# Patient Record
Sex: MALE | Race: WHITE | NOT HISPANIC OR LATINO | Employment: FULL TIME | URBAN - METROPOLITAN AREA
[De-identification: names, ages, dates, MRNs, and addresses within clinical notes are randomized per-mention and may not be internally consistent; named-entity substitution may affect disease eponyms.]

---
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
## 2023-08-22 ENCOUNTER — OFFICE VISIT (OUTPATIENT)
Dept: URGENT CARE | Facility: CLINIC | Age: 18
End: 2023-08-22
Payer: OTHER MISCELLANEOUS

## 2023-08-22 ENCOUNTER — HOSPITAL ENCOUNTER (OUTPATIENT)
Dept: RADIOLOGY | Facility: CLINIC | Age: 18
Discharge: HOME | End: 2023-08-22
Attending: NURSE PRACTITIONER

## 2023-08-22 VITALS
HEIGHT: 73 IN | TEMPERATURE: 100 F | BODY MASS INDEX: 32.47 KG/M2 | WEIGHT: 245 LBS | DIASTOLIC BLOOD PRESSURE: 68 MMHG | RESPIRATION RATE: 20 BRPM | HEART RATE: 100 BPM | OXYGEN SATURATION: 98 % | SYSTOLIC BLOOD PRESSURE: 130 MMHG

## 2023-08-22 DIAGNOSIS — S99.922A FOOT INJURY, LEFT, INITIAL ENCOUNTER: ICD-10-CM

## 2023-08-22 DIAGNOSIS — S99.922A FOOT INJURY, LEFT, INITIAL ENCOUNTER: Primary | ICD-10-CM

## 2023-08-22 DIAGNOSIS — S92.344A CLOSED NONDISPLACED FRACTURE OF FOURTH METATARSAL BONE OF RIGHT FOOT, INITIAL ENCOUNTER: ICD-10-CM

## 2023-08-22 DIAGNOSIS — S92.335A CLOSED NONDISPLACED FRACTURE OF THIRD METATARSAL BONE OF LEFT FOOT, INITIAL ENCOUNTER: ICD-10-CM

## 2023-08-22 DIAGNOSIS — S92.325A CLOSED NONDISPLACED FRACTURE OF SECOND METATARSAL BONE OF LEFT FOOT, INITIAL ENCOUNTER: ICD-10-CM

## 2023-08-22 DIAGNOSIS — Z02.6 ENCOUNTER RELATED TO WORKER'S COMPENSATION CLAIM: ICD-10-CM

## 2023-08-22 LAB — BREATH ALCOHOL: 0

## 2023-08-22 PROCEDURE — 73630 XR FOOT COMPLETE 3 VIEW LEFT: ICD-10-PCS | Mod: LT,S$GLB,, | Performed by: STUDENT IN AN ORGANIZED HEALTH CARE EDUCATION/TRAINING PROGRAM

## 2023-08-22 PROCEDURE — 80305 DRUG TEST PRSMV DIR OPT OBS: CPT | Mod: S$GLB,,, | Performed by: NURSE PRACTITIONER

## 2023-08-22 PROCEDURE — 82075 ASSAY OF BREATH ETHANOL: CPT | Mod: S$GLB,,, | Performed by: NURSE PRACTITIONER

## 2023-08-22 PROCEDURE — 99203 OFFICE O/P NEW LOW 30 MIN: CPT | Mod: S$GLB,,, | Performed by: NURSE PRACTITIONER

## 2023-08-22 PROCEDURE — 99203 PR OFFICE/OUTPT VISIT, NEW, LEVL III, 30-44 MIN: ICD-10-PCS | Mod: S$GLB,,, | Performed by: NURSE PRACTITIONER

## 2023-08-22 PROCEDURE — 73630 X-RAY EXAM OF FOOT: CPT | Mod: LT,S$GLB,, | Performed by: STUDENT IN AN ORGANIZED HEALTH CARE EDUCATION/TRAINING PROGRAM

## 2023-08-22 PROCEDURE — 80305 OOH NON-DOT DRUG SCREEN: ICD-10-PCS | Mod: S$GLB,,, | Performed by: NURSE PRACTITIONER

## 2023-08-22 PROCEDURE — 82075 POCT BREATH ALCOHOL TEST: ICD-10-PCS | Mod: S$GLB,,, | Performed by: NURSE PRACTITIONER

## 2023-08-22 NOTE — LETTER
[facility]  [address], [address]  [place] [address]  Phone: [phone]  Fax: [phone]  [facility]: [phone]    Pt Name: Luis Angel Early Date: 08/22/2023   Employee ID:  Date of First Treatment: 08/22/2023   Company: Networked reference to record [employer]      Appointment Time: 06:15 PM Arrived: 6:30PM   Provider: Mauro Rock NP Time Out:7:55 PM     Office Treatment:   1. Foot injury, left, initial encounter    2. Closed nondisplaced fracture of second metatarsal bone of left foot, initial encounter    3. Closed nondisplaced fracture of third metatarsal bone of left foot, initial encounter    4. Closed nondisplaced fracture of fourth metatarsal bone of right foot, initial encounter    5. Encounter related to worker's compensation claim          Patient Instructions: Apply ice 24-48 hours then apply heat/warm soaks, Elevated affected area, Attention not to aggravate affected area          Return Appointment: Visit date not found at TBD by [facility]

## 2023-08-23 DIAGNOSIS — M79.672 LEFT FOOT PAIN: Primary | ICD-10-CM

## 2023-08-23 NOTE — PATIENT INSTRUCTIONS
PRICE therapy:  Protect the joint with stabilizing brace like a neoprene sleeve or taping.   Rest the extremity--limit activity with this area  Ice 2-3 times a day for 20 minute intervals for first 48 hours or until inflammation has stabilized   Compression to provide support and help prevent swelling  Elevation above heart level to help decrease swelling  You have been seen for a work-related injury/ailment.  It is important that you follow up at one of the Occupational Health Clinics in the next business day for further treatment/evaluation.    Please call [phone] for help setting up the appointment.      A list of clinics is listed below:    - [address] [facility] [address], [place] [address] ([phone])  - [address] [address] ([phone])  - [address] [place] [address] ([phone])  - [address], [place] [address] ([phone])

## 2023-08-23 NOTE — PROGRESS NOTES
Subjective:      Patient ID: Luis Angel Jang is a 17 y.o. male.    Chief Complaint: Foot Injury    Patient's place of employment - [employer]  Patient's job title -   Date of injury - 08/22/2023  Body part injured including left or right - Left foot  Injury Mechanism -  Backyard machine fell over the top of the patient Left foot  What they were doing when they got hurt -  He was standing and the center of gravity was top heavy which made the backyard machine topple over onto his Left foot  What they did immediately after -  Notified supervisor removed boot and sock and immediately elevated foot  Pain scale right now -  1 unless he is weight bearing then it is a 7    Foot Injury   The incident occurred 3 to 6 hours ago. The incident occurred at work. The injury mechanism was a direct blow. The pain is present in the left foot. The quality of the pain is described as shooting. The pain is at a severity of 2/10. The pain is moderate. The pain has been Constant since onset. Associated symptoms include an inability to bear weight, a loss of motion and numbness. Pertinent negatives include no loss of sensation, muscle weakness or tingling. He reports no foreign bodies present. The symptoms are aggravated by weight bearing. He has tried elevation for the symptoms. The treatment provided no relief.       Neurological:  Positive for numbness.     Objective:     Physical Exam  Vitals and nursing note reviewed.   Constitutional:       Appearance: Normal appearance.   HENT:      Head: Normocephalic and atraumatic.      Nose: Nose normal.      Mouth/Throat:      Pharynx: No posterior oropharyngeal erythema.   Eyes:      Extraocular Movements: Extraocular movements intact.      Pupils: Pupils are equal, round, and reactive to light.   Cardiovascular:      Rate and Rhythm: Normal rate and regular rhythm.   Pulmonary:      Effort: Pulmonary effort is normal.      Breath sounds: Normal breath sounds.    Musculoskeletal:         General: Swelling, tenderness and signs of injury present.      Cervical back: Normal range of motion and neck supple.      Right foot: Normal.      Left foot: Decreased range of motion. Normal capillary refill. Swelling, tenderness and bony tenderness present. Normal pulse.        Legs:    Skin:     Capillary Refill: Capillary refill takes less than 2 seconds.   Neurological:      General: No focal deficit present.      Mental Status: He is alert. Mental status is at baseline.     XR FOOT COMPLETE 3 VIEW LEFT    Result Date: 8/22/2023  EXAM: XR FOOT COMPLETE 3 VIEW LEFT CLINICAL HISTORY: Pain. TECHNIQUE: 3 views left foot radiographs COMPARISON:  No studies are available for comparison. FINDINGS: Nondisplaced transversely oriented mid metatarsal fractures of the second third and fourth metatarsals.  No additional fractures identified.  Soft tissue swelling.      As above. Finalized on: 8/22/2023 9:11 PM By:  Roland Herbert MD [facility]# [id number]      2023-08-22 21:13:43.062    [facility]      Results for orders placed or performed in visit on 08/22/23   POCT BREATH ALCOHOL TEST   Result Value Ref Range    Breath Alcohol 0.000          Assessment:      1. Foot injury, left, initial encounter    2. Closed nondisplaced fracture of second metatarsal bone of left foot, initial encounter    3. Closed nondisplaced fracture of third metatarsal bone of left foot, initial encounter    4. Closed nondisplaced fracture of fourth metatarsal bone of right foot, initial encounter    5. Encounter related to worker's compensation claim      Plan:     Discussed X-ray results with patient whom verbalized understanding. Discussed P.R.I.C.E therapy to avoid further injury. Walking shoe and crutches offered to stabilize fractures and avoid further injury but patient refuses them at this time. Ambulatory referral placed to [facility] for further evaluation and treatment. Treatment plan as well as options and alternatives  reviewed and discussed with patient. All of the patients questions and concerns were addressed.The patient verbalized understanding and agrees with the discussed plan of care. Patient remained stable and was discharged in no acute distress.            Patient Instructions: Apply ice 24-48 hours then apply heat/warm soaks, Elevated affected area, Attention not to aggravate affected area      No follow-ups on file.  Patient Instructions   PRICE therapy:  Protect the joint with stabilizing brace like a neoprene sleeve or taping.   Rest the extremity--limit activity with this area  Ice 2-3 times a day for 20 minute intervals for first 48 hours or until inflammation has stabilized   Compression to provide support and help prevent swelling  Elevation above heart level to help decrease swelling  You have been seen for a work-related injury/ailment.  It is important that you follow up at one of the Occupational Health Clinics in the next business day for further treatment/evaluation.    Please call [phone] for help setting up the appointment.      A list of clinics is listed below:    - [address] [facility] [address], [place] [address] ([phone])  - [address] [address] ([phone])  - [address] [place] [address] ([phone])  - [address], [place] [address] ([phone])

## 2023-08-24 ENCOUNTER — HOSPITAL ENCOUNTER (OUTPATIENT)
Dept: RADIOLOGY | Facility: HOSPITAL | Age: 18
Discharge: HOME | End: 2023-08-24
Attending: ORTHOPAEDIC SURGERY
Payer: OTHER MISCELLANEOUS

## 2023-08-24 ENCOUNTER — OFFICE VISIT (OUTPATIENT)
Dept: ORTHOPEDICS | Facility: CLINIC | Age: 18
End: 2023-08-24
Payer: OTHER MISCELLANEOUS

## 2023-08-24 DIAGNOSIS — M79.672 LEFT FOOT PAIN: ICD-10-CM

## 2023-08-24 DIAGNOSIS — S92.335A NONDISPLACED FRACTURE OF THIRD METATARSAL BONE, LEFT FOOT, INITIAL ENCOUNTER FOR CLOSED FRACTURE: ICD-10-CM

## 2023-08-24 DIAGNOSIS — S92.345A NONDISPLACED FRACTURE OF FOURTH METATARSAL BONE, LEFT FOOT, INITIAL ENCOUNTER FOR CLOSED FRACTURE: ICD-10-CM

## 2023-08-24 DIAGNOSIS — S92.325A NONDISPLACED FRACTURE OF SECOND METATARSAL BONE, LEFT FOOT, INITIAL ENCOUNTER FOR CLOSED FRACTURE: Primary | ICD-10-CM

## 2023-08-24 PROCEDURE — 99204 PR OFFICE/OUTPT VISIT, NEW, LEVL IV, 45-59 MIN: ICD-10-PCS | Mod: S$PBB,,, | Performed by: ORTHOPAEDIC SURGERY

## 2023-08-24 PROCEDURE — 99999 PR PBB SHADOW E&M-EST. PATIENT-LVL I: ICD-10-PCS | Mod: PBBFAC,,, | Performed by: ORTHOPAEDIC SURGERY

## 2023-08-24 PROCEDURE — 73610 X-RAY EXAM OF ANKLE: CPT | Mod: 26,LT,, | Performed by: RADIOLOGY

## 2023-08-24 PROCEDURE — 73630 X-RAY EXAM OF FOOT: CPT | Mod: TC,PO,LT

## 2023-08-24 PROCEDURE — 73630 X-RAY EXAM OF FOOT: CPT | Mod: 26,LT,, | Performed by: RADIOLOGY

## 2023-08-24 PROCEDURE — 73610 X-RAY EXAM OF ANKLE: CPT | Mod: TC,PO,LT

## 2023-08-24 PROCEDURE — 73610 XR ANKLE COMPLETE 3 VIEW LEFT: ICD-10-PCS | Mod: 26,LT,, | Performed by: RADIOLOGY

## 2023-08-24 PROCEDURE — 99999 PR PBB SHADOW E&M-EST. PATIENT-LVL I: CPT | Mod: PBBFAC,,, | Performed by: ORTHOPAEDIC SURGERY

## 2023-08-24 PROCEDURE — 73630 XR FOOT COMPLETE 3 VIEW LEFT: ICD-10-PCS | Mod: 26,LT,, | Performed by: RADIOLOGY

## 2023-08-24 PROCEDURE — 99204 OFFICE O/P NEW MOD 45 MIN: CPT | Mod: S$PBB,,, | Performed by: ORTHOPAEDIC SURGERY

## 2023-08-24 NOTE — PROGRESS NOTES
Status/Diagnosis: Nondisplaced Left 2nd/3rd/4th MT shaft fractures  Date of Surgery: none  Date of Injury: 08/22/2023  Return visit: 09/05/2023  X-rays on Return: WB 3-views Left foot    Chief Complaint:   Chief Complaint   Patient presents with    Left Foot - Pain, Injury     Present History:  Luis Angel Jang is a 17 y.o. male who presents today for new patient evaluation.    WORK COMP CASE.  Patient was at work on 08/22 when a piece of heavy machinery landed on the dorsum of his left midfoot.  Patient reports this weight approximately 110.  Immediate pain and swelling with difficulty bearing weight.  Seen at local urgent care at which time x-rays were taken and consistent with fracture.  Patient presents today with no boot or cast.  He has been nonweightbearing using crutches for ambulation.  Denies any pain or instability prior to injury.  He does have some numbness distal to the dorsal midfoot today.  Pain currently 7/10.  Not currently taking any oral NSAIDs.    No significant past medical or surgical history.  Denies tobacco use.    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      Past Medical History:   Diagnosis Date    Meningitis        No past surgical history on file.    Current Outpatient Medications   Medication Sig    multivitamin capsule Take 1 capsule by mouth.     No current facility-administered medications for this visit.       Review of patient's allergies indicates:  No Known Allergies    Family History   Problem Relation Age of Onset    Diabetes Maternal Grandfather     Hypertension Maternal Grandfather        Social History     Socioeconomic History    Marital status: Single   Tobacco Use    Smoking status: Never    Smokeless tobacco: Never   Substance and Sexual Activity    Alcohol use: No    Drug use: No    Sexual activity: Never       Physical exam:  There were no vitals filed for this visit.  There is no height or weight on file to  calculate BMI.  General: In no apparent distress; well developed and well nourished.  HEENT: normocephalic; atraumatic.  Cardiovascular: regular rate.  Respiratory: no increased work of breathing.  Musculoskeletal:   Gait: unable to assess  Inspection:   Moderate residual swelling and ecchymosis involving the dorsum of the midfoot.  Few small areas of residual superficial eschar.  No pain referable to the ankle.  No laxity with anterior drawer testing.  No kaylin laxity or pain with Lisfranc stress however difficult to assess given patient pain/guarding.  Exquisite tenderness on deep palpation of the 2/3/for metatarsal fracture site.  Altered sensation of the dorsum of the foot at and distal to the fracture site including the toes.  Silfverskiold:  Negative  Alignment:  Knee: neutral               Ankle: neutral              Hindfoot: neutral              Forefoot: neutral   Strength:              Dorsiflexion 5/5  Plantar flexion 5/5  Inversion 5/5   Eversion 5/5   Sensation:              SILT distally   ROM:              Ankle: Full and painless.              Subtalar: Painless inversion and eversion   Pulses: 2+ DP/PT pulses.                   Imaging Studies/Outside documentation:  I have ordered/reviewed/interpreted the following images/outside documentation:  1. PARTIAL weight bearing 3-views of Left foot and ankle:   On my independent review, acute appearing nondisplaced fractures of the 2nd, 3rd, and 4th metatarsal shafts.  No intra-articular extension.  Overall alignment well-maintained.  Ankle mortise remains congruent.  Incidental Dorian deformity.  No significant degenerative joint changes.        Assessment:  Luis Angel Jang is a 17 y.o. male with Nondisplaced Left 2nd/3rd/4th MT shaft fractures.  WORK COMP CASE.     Plan:   Clinical and radiographic findings were discussed.  Recommend conservative management given the nondisplaced nature of his fractures.  Patient to be placed into a short boot today.   He was to remain strict nonweightbearing left lower extremity until next clinic visit on 09/05.    May remove boot for sleep, hygiene, home ankle range of motion exercises.  Discussed the importance of rest, ice, compression, elevation.  Over-the-counter p.o. Tylenol as needed for pain.  We will obtain new x-rays at next clinic visit.  We will plan to readdress work status at that time.  Patient voiced understanding.  All questions were answered.    This note was created using voice recognition software and may contain grammatical errors.

## 2023-08-30 DIAGNOSIS — S92.325A NONDISPLACED FRACTURE OF SECOND METATARSAL BONE, LEFT FOOT, INITIAL ENCOUNTER FOR CLOSED FRACTURE: Primary | ICD-10-CM

## 2023-09-05 ENCOUNTER — OFFICE VISIT (OUTPATIENT)
Dept: ORTHOPEDICS | Facility: CLINIC | Age: 18
End: 2023-09-05
Payer: OTHER MISCELLANEOUS

## 2023-09-05 ENCOUNTER — TELEPHONE (OUTPATIENT)
Dept: ORTHOPEDICS | Facility: CLINIC | Age: 18
End: 2023-09-05

## 2023-09-05 ENCOUNTER — HOSPITAL ENCOUNTER (OUTPATIENT)
Dept: RADIOLOGY | Facility: HOSPITAL | Age: 18
Discharge: HOME | End: 2023-09-05
Attending: ORTHOPAEDIC SURGERY
Payer: OTHER MISCELLANEOUS

## 2023-09-05 DIAGNOSIS — S92.325A NONDISPLACED FRACTURE OF SECOND METATARSAL BONE, LEFT FOOT, INITIAL ENCOUNTER FOR CLOSED FRACTURE: ICD-10-CM

## 2023-09-05 DIAGNOSIS — S92.335A NONDISPLACED FRACTURE OF THIRD METATARSAL BONE, LEFT FOOT, INITIAL ENCOUNTER FOR CLOSED FRACTURE: ICD-10-CM

## 2023-09-05 DIAGNOSIS — S92.325A NONDISPLACED FRACTURE OF SECOND METATARSAL BONE, LEFT FOOT, INITIAL ENCOUNTER FOR CLOSED FRACTURE: Primary | ICD-10-CM

## 2023-09-05 DIAGNOSIS — S92.345A NONDISPLACED FRACTURE OF FOURTH METATARSAL BONE, LEFT FOOT, INITIAL ENCOUNTER FOR CLOSED FRACTURE: ICD-10-CM

## 2023-09-05 PROCEDURE — 99213 OFFICE O/P EST LOW 20 MIN: CPT | Mod: S$GLB,,, | Performed by: ORTHOPAEDIC SURGERY

## 2023-09-05 PROCEDURE — 73630 XR FOOT COMPLETE 3 VIEW LEFT: ICD-10-PCS | Mod: 26,LT,, | Performed by: RADIOLOGY

## 2023-09-05 PROCEDURE — 99999 PR PBB SHADOW E&M-EST. PATIENT-LVL I: ICD-10-PCS | Mod: PBBFAC,,, | Performed by: ORTHOPAEDIC SURGERY

## 2023-09-05 PROCEDURE — 99213 PR OFFICE/OUTPT VISIT, EST, LEVL III, 20-29 MIN: ICD-10-PCS | Mod: S$GLB,,, | Performed by: ORTHOPAEDIC SURGERY

## 2023-09-05 PROCEDURE — 73630 X-RAY EXAM OF FOOT: CPT | Mod: 26,LT,, | Performed by: RADIOLOGY

## 2023-09-05 PROCEDURE — 99999 PR PBB SHADOW E&M-EST. PATIENT-LVL I: CPT | Mod: PBBFAC,,, | Performed by: ORTHOPAEDIC SURGERY

## 2023-09-05 PROCEDURE — 73630 X-RAY EXAM OF FOOT: CPT | Mod: TC,PO,LT

## 2023-09-05 NOTE — TELEPHONE ENCOUNTER
----- Message from Azul Nicole MA sent at 9/5/2023 10:04 AM CDT -----  Contact: pt  Needs work release   Call back  [phone]

## 2023-09-05 NOTE — TELEPHONE ENCOUNTER
Pt calling to be cleared for work. Instructed that per office visit note from today, pt is to remain light duty (desk duty) for 2 weeks until PT transitions him out of the boot. Pt explained that he is a  and cannot be desk duty to which I told the pt that he is not cleared for work if that is the case. Offered letter stating restrictions, pt declined and will reach out if he needs paperwork completed.

## 2023-09-05 NOTE — PROGRESS NOTES
Status/Diagnosis: Nondisplaced Left 2nd/3rd/4th MT shaft fractures  Date of Surgery: none  Date of Injury: 08/22/2023  Return visit: 4 weeks  X-rays on Return: WB 3-views Left foot    Chief Complaint:   Chief Complaint   Patient presents with    Left Foot - Pain     Present History:  Luis Angel Jang is a 17 y.o. male who presents today for new patient evaluation.    WORK COMP CASE.  Patient was at work on 08/22 when a piece of heavy machinery landed on the dorsum of his left midfoot.  Patient reports this weight approximately 110.  Immediate pain and swelling with difficulty bearing weight.  Seen at local urgent care at which time x-rays were taken and consistent with fracture.  Patient presents today with no boot or cast.  He has been nonweightbearing using crutches for ambulation.  Denies any pain or instability prior to injury.  He does have some numbness distal to the dorsal midfoot today.  Pain currently 7/10.  Not currently taking any oral NSAIDs.    No significant past medical or surgical history.  Denies tobacco use.    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    09/05/2023:  Patient returns today for repeat clinical evaluation.  Reports compliance with nonweightbearing status.  Endorses 0/10 pain.  Swelling has improved significantly since new patient evaluation.  No new injuries.  Denies any numbness or tingling.      Past Medical History:   Diagnosis Date    Meningitis        No past surgical history on file.    Current Outpatient Medications   Medication Sig    multivitamin capsule Take 1 capsule by mouth.     No current facility-administered medications for this visit.       Review of patient's allergies indicates:  No Known Allergies    Family History   Problem Relation Age of Onset    Diabetes Maternal Grandfather     Hypertension Maternal Grandfather        Social History     Socioeconomic History    Marital status: Single   Tobacco Use    Smoking  status: Never    Smokeless tobacco: Never   Substance and Sexual Activity    Alcohol use: No    Drug use: No    Sexual activity: Never       Physical exam:  There were no vitals filed for this visit.  There is no height or weight on file to calculate BMI.  General: In no apparent distress; well developed and well nourished.  HEENT: normocephalic; atraumatic.  Cardiovascular: regular rate.  Respiratory: no increased work of breathing.  Musculoskeletal:   Gait: unable to assess  Inspection:   Minimal residual swelling and no ecchymosis involving the dorsum of the midfoot.  Few small areas of residual superficial eschar -- improved.  No pain referable to the ankle.  No laxity with anterior drawer testing.  No kaylin laxity or pain with Lisfranc stress.  Somewhat residual altered sensation of the dorsum of the foot at and distal to the fracture site including the toes.  Silfverskiold:  Negative  Alignment:  Knee: neutral               Ankle: neutral              Hindfoot: neutral              Forefoot: neutral   Strength:              Dorsiflexion 5/5  Plantar flexion 5/5  Inversion 5/5   Eversion 5/5   Sensation:              SILT distally   ROM:              Ankle: Full and painless.              Subtalar: Painless inversion and eversion   Pulses: 2+ DP/PT pulses.                   Imaging Studies/Outside documentation:  I have ordered/reviewed/interpreted the following images/outside documentation:  1. WB 3-views of Left foot and ankle:   On my independent review, persistent nondisplaced fractures of the 2nd, 3rd, and 4th metatarsal shafts.  No intra-articular extension.  Overall alignment well-maintained with no evidence of interval fracture displacement.        Assessment:  Luis Angel Jang is a 17 y.o. male with Nondisplaced Left 2nd/3rd/4th MT shaft fractures.  WORK COMP CASE.     Plan:   Clinical and radiographic findings were discussed.  Overall alignment well-maintained.  No significant early callus formation is  noted on repeat x-rays today.  Patient will be allowed to progress to partial, 50%, weight-bearing using crutches for the next 2 weeks.  After this he was then progress to full weight-bearing in the boot.  Reinforced the importance of any weight-bearing to be performed in the boot.  May remove for sleep, hygiene, home ankle range of motion exercises.  We did discuss the potential for light duty.  Patient can perform light duty as it pertains to desk work/sedentary work/etc.  For the next 4 weeks.  If work requires him to be up on his feet, he will have to wait at least the next 2 weeks until he is full weight-bearing in the boot assuming his employer will allow him to wear the boot while at work.  Patient and family voiced understanding.  All questions were answered.  Return to clinic in 4 weeks for repeat evaluation and x-ray.  Possible transition out of the boot into a steel shank inserts at that time.    This note was created using voice recognition software and may contain grammatical errors.

## 2023-09-18 DIAGNOSIS — S92.325A NONDISPLACED FRACTURE OF SECOND METATARSAL BONE, LEFT FOOT, INITIAL ENCOUNTER FOR CLOSED FRACTURE: Primary | ICD-10-CM

## 2023-09-19 ENCOUNTER — HOSPITAL ENCOUNTER (OUTPATIENT)
Dept: RADIOLOGY | Facility: HOSPITAL | Age: 18
Discharge: HOME | End: 2023-09-19
Attending: ORTHOPAEDIC SURGERY
Payer: OTHER MISCELLANEOUS

## 2023-09-19 ENCOUNTER — OFFICE VISIT (OUTPATIENT)
Dept: ORTHOPEDICS | Facility: CLINIC | Age: 18
End: 2023-09-19
Payer: OTHER MISCELLANEOUS

## 2023-09-19 DIAGNOSIS — S92.345A NONDISPLACED FRACTURE OF FOURTH METATARSAL BONE, LEFT FOOT, INITIAL ENCOUNTER FOR CLOSED FRACTURE: ICD-10-CM

## 2023-09-19 DIAGNOSIS — S92.335A NONDISPLACED FRACTURE OF THIRD METATARSAL BONE, LEFT FOOT, INITIAL ENCOUNTER FOR CLOSED FRACTURE: ICD-10-CM

## 2023-09-19 DIAGNOSIS — S92.325A NONDISPLACED FRACTURE OF SECOND METATARSAL BONE, LEFT FOOT, INITIAL ENCOUNTER FOR CLOSED FRACTURE: ICD-10-CM

## 2023-09-19 DIAGNOSIS — S92.325A NONDISPLACED FRACTURE OF SECOND METATARSAL BONE, LEFT FOOT, INITIAL ENCOUNTER FOR CLOSED FRACTURE: Primary | ICD-10-CM

## 2023-09-19 PROCEDURE — 99999 PR PBB SHADOW E&M-EST. PATIENT-LVL II: CPT | Mod: PBBFAC,,, | Performed by: ORTHOPAEDIC SURGERY

## 2023-09-19 PROCEDURE — 99213 PR OFFICE/OUTPT VISIT, EST, LEVL III, 20-29 MIN: ICD-10-PCS | Mod: S$GLB,,, | Performed by: ORTHOPAEDIC SURGERY

## 2023-09-19 PROCEDURE — 73630 X-RAY EXAM OF FOOT: CPT | Mod: TC,PO,LT

## 2023-09-19 PROCEDURE — 73630 X-RAY EXAM OF FOOT: CPT | Mod: 26,LT,, | Performed by: RADIOLOGY

## 2023-09-19 PROCEDURE — 99999 PR PBB SHADOW E&M-EST. PATIENT-LVL II: ICD-10-PCS | Mod: PBBFAC,,, | Performed by: ORTHOPAEDIC SURGERY

## 2023-09-19 PROCEDURE — 99213 OFFICE O/P EST LOW 20 MIN: CPT | Mod: S$GLB,,, | Performed by: ORTHOPAEDIC SURGERY

## 2023-09-19 PROCEDURE — 73630 XR FOOT COMPLETE 3 VIEW LEFT: ICD-10-PCS | Mod: 26,LT,, | Performed by: RADIOLOGY

## 2023-09-19 NOTE — LETTER
September 19, 2023      [facility]  [address] [place] [address]  Phone: [phone]       Patient: Luis Angel Jang   YOB: 2005  Date of Visit: 09/19/2023    To Whom It May Concern:    Teressa Jang  was at [facility] on 09/19/2023. The patient may return to work on 9/20/23 with the following restrictions:     Light Duty  No climbing ladders  No lifting > 25 lbs  Allow to sit 10 min every 1 hr as needed    If you have any questions or concerns, or if I can be of further assistance, please do not hesitate to contact me.    Sincerely,    Timur Bermudez RN  Orthopedic Surgery [facility]

## 2023-10-08 NOTE — PROGRESS NOTES
Status/Diagnosis: Nondisplaced Left 2nd/3rd/4th MT shaft fractures  Date of Surgery: none  Date of Injury: 08/22/2023  Return visit: 5 weeks  X-rays on Return: WB 3-views Left foot    Chief Complaint:   Chief Complaint   Patient presents with    Left Foot - Pain     Present History:  Luis Angel Jang is a 17 y.o. male who presents today for new patient evaluation.    WORK COMP CASE.  Patient was at work on 08/22 when a piece of heavy machinery landed on the dorsum of his left midfoot.  Patient reports this weight approximately 110.  Immediate pain and swelling with difficulty bearing weight.  Seen at local urgent care at which time x-rays were taken and consistent with fracture.  Patient presents today with no boot or cast.  He has been nonweightbearing using crutches for ambulation.  Denies any pain or instability prior to injury.  He does have some numbness distal to the dorsal midfoot today.  Pain currently 7/10.  Not currently taking any oral NSAIDs.    No significant past medical or surgical history.  Denies tobacco use.    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    09/05/2023:  Patient returns today for repeat clinical evaluation.  Reports compliance with nonweightbearing status.  Endorses 0/10 pain.  Swelling has improved significantly since new patient evaluation.  No new injuries.  Denies any numbness or tingling.    09/19/2023:  Patient returns today for repeat ankle evaluation and x-ray.  He was full weight-bearing as of yesterday.  Patient remains in the boot.  He remains off work.      Past Medical History:   Diagnosis Date    Meningitis        No past surgical history on file.    Current Outpatient Medications   Medication Sig    multivitamin capsule Take 1 capsule by mouth.     No current facility-administered medications for this visit.       Review of patient's allergies indicates:  No Known Allergies    Family History   Problem Relation Age of  Onset    Diabetes Maternal Grandfather     Hypertension Maternal Grandfather        Social History     Socioeconomic History    Marital status: Single   Tobacco Use    Smoking status: Never    Smokeless tobacco: Never   Substance and Sexual Activity    Alcohol use: No    Drug use: No    Sexual activity: Never       Physical exam:  There were no vitals filed for this visit.  There is no height or weight on file to calculate BMI.  General: In no apparent distress; well developed and well nourished.  HEENT: normocephalic; atraumatic.  Cardiovascular: regular rate.  Respiratory: no increased work of breathing.  Musculoskeletal:   Gait: mild antalgic  Inspection:   Minimal residual swelling and no ecchymosis involving the dorsum of the midfoot.  Minimal residual eschar over the dorsal foot.  Mild residual tenderness over the 3rd >> 2nd/4th metatarsal fractures.    No pain referable to the ankle.  No laxity with anterior drawer testing.  No kaylin laxity or pain with Lisfranc stress.  Somewhat residual altered sensation of the dorsum of the foot at and distal to the fracture site including the toes.  Silfverskiold:  Negative  Alignment:  Knee: neutral               Ankle: neutral              Hindfoot: neutral              Forefoot: neutral   Strength:              Dorsiflexion 5/5  Plantar flexion 5/5  Inversion 5/5   Eversion 5/5   Sensation:              SILT distally   ROM:              Ankle: Full and painless.              Subtalar: Painless inversion and eversion   Pulses: 2+ DP/PT pulses.                   Imaging Studies/Outside documentation:  I have ordered/reviewed/interpreted the following images/outside documentation:  1. WB 3-views of Left foot:   On my independent review, persistent nondisplaced fractures of the 2nd, 3rd, and 4th metatarsal shafts.  No intra-articular extension.  Overall alignment well-maintained with no evidence of interval fracture displacement.  Callus formation is present on repeat  imaging.       Assessment:  Luis Angel Jang is a 17 y.o. male with Nondisplaced Left 2nd/3rd/4th MT shaft fractures.  WORK COMP CASE.     Plan:   Clinical and radiographic findings were discussed.    Interval callus formation is noted.  Patient has currently full weight-bearing in his boot.  We will transition from boot to postoperative shoe wear.  We will allow him to go back to work in a limited capacity at light duty.  No climbing.  No lifting greater than 25 lb.  Allow patient to sit every hour as needed.  Patient informed let pain his guide.  Return to clinic in 5 weeks for repeat evaluation.  Patient voiced understanding.  All questions were answered.  We will readdress work status at that time.      This note was created using voice recognition software and may contain grammatical errors.

## 2023-10-19 DIAGNOSIS — S92.325A NONDISPLACED FRACTURE OF SECOND METATARSAL BONE, LEFT FOOT, INITIAL ENCOUNTER FOR CLOSED FRACTURE: Primary | ICD-10-CM

## 2023-10-24 ENCOUNTER — HOSPITAL ENCOUNTER (OUTPATIENT)
Dept: RADIOLOGY | Facility: HOSPITAL | Age: 18
Discharge: HOME | End: 2023-10-24
Attending: ORTHOPAEDIC SURGERY
Payer: OTHER MISCELLANEOUS

## 2023-10-24 ENCOUNTER — OFFICE VISIT (OUTPATIENT)
Dept: ORTHOPEDICS | Facility: CLINIC | Age: 18
End: 2023-10-24
Payer: OTHER MISCELLANEOUS

## 2023-10-24 DIAGNOSIS — S92.325A NONDISPLACED FRACTURE OF SECOND METATARSAL BONE, LEFT FOOT, INITIAL ENCOUNTER FOR CLOSED FRACTURE: ICD-10-CM

## 2023-10-24 DIAGNOSIS — S92.335A NONDISPLACED FRACTURE OF THIRD METATARSAL BONE, LEFT FOOT, INITIAL ENCOUNTER FOR CLOSED FRACTURE: ICD-10-CM

## 2023-10-24 DIAGNOSIS — S92.325A NONDISPLACED FRACTURE OF SECOND METATARSAL BONE, LEFT FOOT, INITIAL ENCOUNTER FOR CLOSED FRACTURE: Primary | ICD-10-CM

## 2023-10-24 DIAGNOSIS — S92.345A NONDISPLACED FRACTURE OF FOURTH METATARSAL BONE, LEFT FOOT, INITIAL ENCOUNTER FOR CLOSED FRACTURE: ICD-10-CM

## 2023-10-24 PROCEDURE — 99999 PR PBB SHADOW E&M-EST. PATIENT-LVL I: CPT | Mod: PBBFAC,,, | Performed by: ORTHOPAEDIC SURGERY

## 2023-10-24 PROCEDURE — 99213 OFFICE O/P EST LOW 20 MIN: CPT | Mod: S$GLB,,, | Performed by: ORTHOPAEDIC SURGERY

## 2023-10-24 PROCEDURE — 99999 PR PBB SHADOW E&M-EST. PATIENT-LVL I: ICD-10-PCS | Mod: PBBFAC,,, | Performed by: ORTHOPAEDIC SURGERY

## 2023-10-24 PROCEDURE — 73630 X-RAY EXAM OF FOOT: CPT | Mod: TC,PO,LT

## 2023-10-24 PROCEDURE — 73630 XR FOOT COMPLETE 3 VIEW LEFT: ICD-10-PCS | Mod: 26,LT,, | Performed by: RADIOLOGY

## 2023-10-24 PROCEDURE — 73630 X-RAY EXAM OF FOOT: CPT | Mod: 26,LT,, | Performed by: RADIOLOGY

## 2023-10-24 PROCEDURE — 99213 PR OFFICE/OUTPT VISIT, EST, LEVL III, 20-29 MIN: ICD-10-PCS | Mod: S$GLB,,, | Performed by: ORTHOPAEDIC SURGERY

## 2023-10-24 NOTE — PROGRESS NOTES
Status/Diagnosis: Nondisplaced Left 2nd/3rd/4th MT shaft fractures  Date of Surgery: none  Date of Injury: 08/22/2023  Return visit: 1 month  X-rays on Return: WB 3-views Left foot    Chief Complaint:   Chief Complaint   Patient presents with    Left Foot - Injury     Present History:  Luis Angel Jang is a 18 y.o. male who presents today for new patient evaluation.    WORK COMP CASE.  Patient was at work on 08/22 when a piece of heavy machinery landed on the dorsum of his left midfoot.  Patient reports this weight approximately 110.  Immediate pain and swelling with difficulty bearing weight.  Seen at local urgent care at which time x-rays were taken and consistent with fracture.  Patient presents today with no boot or cast.  He has been nonweightbearing using crutches for ambulation.  Denies any pain or instability prior to injury.  He does have some numbness distal to the dorsal midfoot today.  Pain currently 7/10.  Not currently taking any oral NSAIDs.    No significant past medical or surgical history.  Denies tobacco use.    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    09/05/2023:  Patient returns today for repeat clinical evaluation.  Reports compliance with nonweightbearing status.  Endorses 0/10 pain.  Swelling has improved significantly since new patient evaluation.  No new injuries.  Denies any numbness or tingling.    09/19/2023:  Patient returns today for repeat ankle evaluation and x-ray.  He was full weight-bearing as of yesterday.  Patient remains in the boot.  He remains off work.    10/24/2023:  Patient returns today for repeat clinical evaluation.  Overall doing well.  He has been weight-bearing as tolerated in postoperative shoe.  Has been light duty since last being seen.  Significant wear and tear of the postoperative was noted.      Past Medical History:   Diagnosis Date    Meningitis        No past surgical history on file.    Current  Outpatient Medications   Medication Sig    multivitamin capsule Take 1 capsule by mouth.     No current facility-administered medications for this visit.       Review of patient's allergies indicates:  No Known Allergies    Family History   Problem Relation Age of Onset    Diabetes Maternal Grandfather     Hypertension Maternal Grandfather        Social History     Socioeconomic History    Marital status: Single   Tobacco Use    Smoking status: Never    Smokeless tobacco: Never   Substance and Sexual Activity    Alcohol use: No    Drug use: No    Sexual activity: Never       Physical exam:  There were no vitals filed for this visit.  There is no height or weight on file to calculate BMI.  General: In no apparent distress; well developed and well nourished.  HEENT: normocephalic; atraumatic.  Cardiovascular: regular rate.  Respiratory: no increased work of breathing.  Musculoskeletal:   Gait: nonantalgic  Inspection:   Little to no residual swelling and no ecchymosis involving the dorsum of the midfoot.  Minimal residual tenderness over the 3rd >> 2nd/4th metatarsal fractures.    No pain referable to the ankle.  No laxity with anterior drawer testing.  No kaylin laxity or pain with Lisfranc stress.  Somewhat residual altered sensation of the dorsum of the foot at and distal to the fracture site including the toes.  Silfverskiold:  Negative  Alignment:  Knee: neutral               Ankle: neutral              Hindfoot: neutral              Forefoot: neutral   Strength:              Dorsiflexion 5/5  Plantar flexion 5/5  Inversion 5/5   Eversion 5/5   Sensation:              SILT distally   ROM:              Ankle: Full and painless.              Subtalar: Painless inversion and eversion   Pulses: 2+ DP/PT pulses.                   Imaging Studies/Outside documentation:  I have ordered/reviewed/interpreted the following images/outside documentation:  1. WB 3-views of Left foot:   On my independent review, persistent  nondisplaced fractures of the 2nd, 3rd, and 4th metatarsal shafts.  No intra-articular extension.  Overall alignment well-maintained with no evidence of interval fracture displacement.  Significant callus formation is present today versus previous clinic films.     Assessment:  Luis Angel Jang is a 18 y.o. male with Nondisplaced Left 2nd/3rd/4th MT shaft fractures.  WORK COMP CASE.     Plan:   Clinical and radiographic findings were discussed.  Significant callus formation is present with overall alignment well-maintained.    We will allow patient to transition from postoperative shoe to rigid soled shoe wear.  Handout for a carbon fiber insert was provided.  Patient to wear at all times, work or otherwise.  We will continue current work restrictions in a limited capacity at light duty.  No climbing.  No lifting greater than 25 lb.  Allow patient to sit every hour as needed.  Patient informed to let pain be his guide.  Return to clinic in 1 month for repeat evaluation.  Patient voiced understanding.  All questions were answered.    Likely able to allow patient to return to work at full duty at that time.      This note was created using voice recognition software and may contain grammatical errors.

## 2023-11-22 DIAGNOSIS — S92.325A NONDISPLACED FRACTURE OF SECOND METATARSAL BONE, LEFT FOOT, INITIAL ENCOUNTER FOR CLOSED FRACTURE: Primary | ICD-10-CM

## 2023-11-28 ENCOUNTER — OFFICE VISIT (OUTPATIENT)
Dept: ORTHOPEDICS | Facility: CLINIC | Age: 18
End: 2023-11-28
Payer: OTHER MISCELLANEOUS

## 2023-11-28 ENCOUNTER — HOSPITAL ENCOUNTER (OUTPATIENT)
Dept: RADIOLOGY | Facility: HOSPITAL | Age: 18
Discharge: HOME | End: 2023-11-28
Attending: ORTHOPAEDIC SURGERY
Payer: OTHER MISCELLANEOUS

## 2023-11-28 VITALS — WEIGHT: 245 LBS | HEIGHT: 73 IN | BODY MASS INDEX: 32.47 KG/M2

## 2023-11-28 DIAGNOSIS — S92.335A NONDISPLACED FRACTURE OF THIRD METATARSAL BONE, LEFT FOOT, INITIAL ENCOUNTER FOR CLOSED FRACTURE: ICD-10-CM

## 2023-11-28 DIAGNOSIS — S92.325A NONDISPLACED FRACTURE OF SECOND METATARSAL BONE, LEFT FOOT, INITIAL ENCOUNTER FOR CLOSED FRACTURE: ICD-10-CM

## 2023-11-28 DIAGNOSIS — S92.345A NONDISPLACED FRACTURE OF FOURTH METATARSAL BONE, LEFT FOOT, INITIAL ENCOUNTER FOR CLOSED FRACTURE: ICD-10-CM

## 2023-11-28 DIAGNOSIS — S92.325A NONDISPLACED FRACTURE OF SECOND METATARSAL BONE, LEFT FOOT, INITIAL ENCOUNTER FOR CLOSED FRACTURE: Primary | ICD-10-CM

## 2023-11-28 PROCEDURE — 73630 X-RAY EXAM OF FOOT: CPT | Mod: 26,LT,, | Performed by: RADIOLOGY

## 2023-11-28 PROCEDURE — 99999 PR PBB SHADOW E&M-EST. PATIENT-LVL II: CPT | Mod: PBBFAC,,, | Performed by: ORTHOPAEDIC SURGERY

## 2023-11-28 PROCEDURE — 99213 OFFICE O/P EST LOW 20 MIN: CPT | Mod: S$GLB,,, | Performed by: ORTHOPAEDIC SURGERY

## 2023-11-28 PROCEDURE — 73630 XR FOOT COMPLETE 3 VIEW LEFT: ICD-10-PCS | Mod: 26,LT,, | Performed by: RADIOLOGY

## 2023-11-28 PROCEDURE — 99213 PR OFFICE/OUTPT VISIT, EST, LEVL III, 20-29 MIN: ICD-10-PCS | Mod: S$GLB,,, | Performed by: ORTHOPAEDIC SURGERY

## 2023-11-28 PROCEDURE — 99999 PR PBB SHADOW E&M-EST. PATIENT-LVL II: ICD-10-PCS | Mod: PBBFAC,,, | Performed by: ORTHOPAEDIC SURGERY

## 2023-11-28 PROCEDURE — 73630 X-RAY EXAM OF FOOT: CPT | Mod: TC,PO,LT

## 2023-11-28 NOTE — PROGRESS NOTES
Status/Diagnosis: Nondisplaced Left 2nd/3rd/4th MT shaft fractures  Date of Surgery: none  Date of Injury: 08/22/2023  Return visit: PRN  X-rays on Return: pending patient complaint    Chief Complaint:   Chief Complaint   Patient presents with    Left Foot - Injury     Present History:  Luis Angel Jang is a 18 y.o. male who presents today for new patient evaluation.    WORK COMP CASE.  Patient was at work on 08/22 when a piece of heavy machinery landed on the dorsum of his left midfoot.  Patient reports this weight approximately 110.  Immediate pain and swelling with difficulty bearing weight.  Seen at local urgent care at which time x-rays were taken and consistent with fracture.  Patient presents today with no boot or cast.  He has been nonweightbearing using crutches for ambulation.  Denies any pain or instability prior to injury.  He does have some numbness distal to the dorsal midfoot today.  Pain currently 7/10.  Not currently taking any oral NSAIDs.    No significant past medical or surgical history.  Denies tobacco use.    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    09/05/2023:  Patient returns today for repeat clinical evaluation.  Reports compliance with nonweightbearing status.  Endorses 0/10 pain.  Swelling has improved significantly since new patient evaluation.  No new injuries.  Denies any numbness or tingling.    09/19/2023:  Patient returns today for repeat ankle evaluation and x-ray.  He was full weight-bearing as of yesterday.  Patient remains in the boot.  He remains off work.    10/24/2023:  Patient returns today for repeat clinical evaluation.  Overall doing well.  He has been weight-bearing as tolerated in postoperative shoe.  Has been light duty since last being seen.  Significant wear and tear of the postoperative was noted.    11/28/2023:  Patient continues to do extremely well in regard to rehabilitation.  0/10 pain.  Currently working  at light duty.  Reports compliance with boot wear with rigid soled inserts in place.  No new injuries.  Denies any numbness or tingling.      Past Medical History:   Diagnosis Date    Meningitis        No past surgical history on file.    Current Outpatient Medications   Medication Sig    multivitamin capsule Take 1 capsule by mouth.     No current facility-administered medications for this visit.       Review of patient's allergies indicates:  No Known Allergies    Family History   Problem Relation Age of Onset    Diabetes Maternal Grandfather     Hypertension Maternal Grandfather        Social History     Socioeconomic History    Marital status: Single   Tobacco Use    Smoking status: Never    Smokeless tobacco: Never   Substance and Sexual Activity    Alcohol use: No    Drug use: No    Sexual activity: Never       Physical exam:  There were no vitals filed for this visit.  Body mass index is 32.32 kg/m².  General: In no apparent distress; well developed and well nourished.  HEENT: normocephalic; atraumatic.  Cardiovascular: regular rate.  Respiratory: no increased work of breathing.  Musculoskeletal:   Gait: nonantalgic  Inspection:   No residual swelling and no ecchymosis involving the dorsum of the midfoot.  Palpable callus but no residual tenderness over the metatarsal fractures.    No pain referable to the ankle.  No laxity with anterior drawer testing.  No kaylin laxity or pain with Lisfranc stress.  Previously noted altered sensation along the dorsum of the foot is improved.    Silfverskiold:  Negative  Alignment:  Knee: neutral               Ankle: neutral              Hindfoot: neutral              Forefoot: neutral   Strength:              Dorsiflexion 5/5  Plantar flexion 5/5  Inversion 5/5   Eversion 5/5   Sensation:              SILT distally   ROM:              Ankle: Full and painless.              Subtalar: Painless inversion and eversion   Pulses: 2+ DP/PT pulses.                   Imaging  Studies/Outside documentation:  I have ordered/reviewed/interpreted the following images/outside documentation:  1. WB 3-views of Left foot:   On my independent review, persistent nondisplaced fractures of the 2nd, 3rd, and 4th metatarsal shafts.  No intra-articular extension.  Overall alignment well-maintained with no evidence of interval fracture displacement.  Significant callus formation with near complete fracture healing.     Assessment:  Luis Angel Jang is a 18 y.o. male with Nondisplaced Left 2nd/3rd/4th MT shaft fractures.  WORK COMP CASE.     Plan:   Clinical and radiographic findings were discussed.    Patient has significant callus formation with near-complete fracture healing, now 3 months out from injury.  No residual symptoms on clinical exam.  Patient to continue rigid soled shoe wear at all times.  Okay to return to work at full duty without restriction.  Patient voiced understanding.  All questions were answered.  He will follow up on an as-needed basis.      This note was created using voice recognition software and may contain grammatical errors.

## 2023-11-28 NOTE — LETTER
November 28, 2023      [facility]  [address] [place] [address]  Phone: [phone]       Patient: Luis Angel Jang   YOB: 2005  Date of Visit: 11/28/2023    To Whom It May Concern:    Teressa Jang  was at [facility] on 11/28/2023. The patient may return to work on 11/29/2023 with no restrictions. If you have any questions or concerns, or if I can be of further assistance, please do not hesitate to contact me.    Sincerely,    Rey Senior M.D.

## 2024-11-07 ENCOUNTER — OCCUPATIONAL HEALTH (OUTPATIENT)
Dept: URGENT CARE | Facility: CLINIC | Age: 19
End: 2024-11-07

## 2024-11-07 VITALS — BODY MASS INDEX: 32.76 KG/M2 | WEIGHT: 234 LBS | HEIGHT: 71 IN

## 2024-11-07 DIAGNOSIS — Z02.89 ENCOUNTER FOR EXAMINATION REQUIRED BY DEPARTMENT OF TRANSPORTATION (DOT): Primary | ICD-10-CM

## 2024-11-07 DIAGNOSIS — Z02.1 ENCOUNTER FOR PRE-EMPLOYMENT EXAMINATION: ICD-10-CM

## 2024-11-07 NOTE — PATIENT INSTRUCTIONS
OK for employment, CDL form completed.    Please drink plenty of fluids.  Please get plenty of rest.  Please return here or go to the Emergency Department for any concerns or worsening of condition.    If not allergic, please take over the counter Tylenol (Acetaminophen) and/or Motrin (Ibuprofen) as directed for control of pain and/or fever.    Please follow up with your primary care doctor or specialist as needed.  No, Primary Doctor  None    You must understand that you have received treatment at an Urgent Care facility only, and that you may be  released before all of your medical problems are known or treated. Urgent Care facilities are not equipped to  handle life threatening emergencies. It is recommended that you seek care at an Emergency Department for  further evaluation of worsening or concerning symptoms, or possibly life threatening conditions as  discussed.

## 2024-11-07 NOTE — PROGRESS NOTES
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    OK for employment, CDL form completed.    Please drink plenty of fluids.  Please get plenty of rest.  Please return here or go to the Emergency Department for any concerns or worsening of condition.    If not allergic, please take over the counter Tylenol (Acetaminophen) and/or Motrin (Ibuprofen) as directed for control of pain and/or fever.    Please follow up with your primary care doctor or specialist as needed.  No, Primary Doctor  None    You must understand that you have received treatment at an Urgent Care facility only, and that you may be  released before all of your medical problems are known or treated. Urgent Care facilities are not equipped to  handle life threatening emergencies. It is recommended that you seek care at an Emergency Department for  further evaluation of worsening or concerning symptoms, or possibly life threatening conditions as  discussed.